# Patient Record
Sex: FEMALE | URBAN - METROPOLITAN AREA
[De-identification: names, ages, dates, MRNs, and addresses within clinical notes are randomized per-mention and may not be internally consistent; named-entity substitution may affect disease eponyms.]

---

## 2020-01-02 ENCOUNTER — APPOINTMENT (RX ONLY)
Age: 45
Setting detail: DERMATOLOGY
End: 2020-01-02

## 2020-01-02 DIAGNOSIS — L20.89 OTHER ATOPIC DERMATITIS: ICD-10-CM

## 2020-01-02 PROCEDURE — 99202 OFFICE O/P NEW SF 15 MIN: CPT

## 2020-01-02 PROCEDURE — ? PRESCRIPTION

## 2020-01-02 PROCEDURE — ? COUNSELING

## 2020-01-02 PROCEDURE — ? DIAGNOSIS COMMENT

## 2020-01-02 RX ORDER — TACROLIMUS 1 MG/G
OINTMENT TOPICAL
Qty: 1 | Refills: 0 | Status: ERX | COMMUNITY
Start: 2020-01-02

## 2020-01-02 RX ADMIN — TACROLIMUS: 1 OINTMENT TOPICAL at 00:00

## 2020-01-02 ASSESSMENT — SEVERITY ASSESSMENT: SEVERITY: MODERATE

## 2020-01-02 NOTE — PROCEDURE: DIAGNOSIS COMMENT
Comment: Also consider allergic contact dermatitis to vitamin E, components of her pen (which she uses to scratch her face) and/or irritant dermatitis from her regular use of alcohol swabs. I instructed her to limit her products to Protopic, Vaseline, and Dove unscented only for now.
Detail Level: Zone

## 2020-01-02 NOTE — PROCEDURE: COUNSELING
Patient Specific Counseling (Will Not Stick From Patient To Patient): **Take Claritin 2-3 tablets daily. Ok to add Benadryl in addition to the Claritin.\\nShe has a history of osteoporosis and thus declines oral steroids which is reasonable; She requested Elidel but I prefer Protopic with the petrolatum base.  Protopic is covered by her insurance.  We discussed the black box warnings of lymphoma.
Detail Level: Generalized

## 2020-01-03 ENCOUNTER — APPOINTMENT (RX ONLY)
Age: 45
Setting detail: DERMATOLOGY
End: 2020-01-03

## 2020-01-03 DIAGNOSIS — L20.89 OTHER ATOPIC DERMATITIS: ICD-10-CM

## 2020-01-03 PROCEDURE — ? PRESCRIPTION

## 2020-01-03 RX ORDER — PIMECROLIMUS 10 MG/G
CREAM TOPICAL
Qty: 1 | Refills: 1 | Status: ERX | COMMUNITY
Start: 2020-01-03

## 2020-01-03 RX ADMIN — PIMECROLIMUS: 10 CREAM TOPICAL at 00:00

## 2020-01-06 ENCOUNTER — APPOINTMENT (RX ONLY)
Age: 45
Setting detail: DERMATOLOGY
End: 2020-01-06

## 2020-01-06 DIAGNOSIS — L20.89 OTHER ATOPIC DERMATITIS: ICD-10-CM

## 2020-01-06 PROCEDURE — 99212 OFFICE O/P EST SF 10 MIN: CPT

## 2020-01-06 PROCEDURE — ? PATIENT SPECIFIC COUNSELING

## 2020-01-06 NOTE — PROCEDURE: PATIENT SPECIFIC COUNSELING
**Patient is struggling to try to figure out which products to use.  She is having patch testing soon which may be helpful. She brought in a few products which I don't recommend using the products brought in due to peppermint oil. I also told her that I prefer tacrolimus over Elidel since it has fewer ingredients and a more effective vehicle (ointment vs. cream). Her itch is out of control. Continue topicals until the results of the patch testing are available.
Detail Level: Zone

## 2020-01-09 ENCOUNTER — APPOINTMENT (RX ONLY)
Age: 45
Setting detail: DERMATOLOGY
End: 2020-01-09

## 2020-01-09 DIAGNOSIS — L20.89 OTHER ATOPIC DERMATITIS: ICD-10-CM

## 2020-01-09 PROCEDURE — ? PATIENT SPECIFIC COUNSELING

## 2020-01-09 PROCEDURE — 99213 OFFICE O/P EST LOW 20 MIN: CPT

## 2020-01-16 ENCOUNTER — APPOINTMENT (RX ONLY)
Age: 45
Setting detail: DERMATOLOGY
End: 2020-01-16

## 2020-01-16 DIAGNOSIS — L20.89 OTHER ATOPIC DERMATITIS: ICD-10-CM | Status: WORSENING

## 2020-01-16 PROCEDURE — ? DIAGNOSIS COMMENT

## 2020-01-16 PROCEDURE — ? PATIENT SPECIFIC COUNSELING

## 2020-01-16 PROCEDURE — 99214 OFFICE O/P EST MOD 30 MIN: CPT

## 2020-01-16 PROCEDURE — ? PRESCRIPTION

## 2020-01-16 PROCEDURE — ? ORDER TESTS

## 2020-01-16 NOTE — PROCEDURE: DIAGNOSIS COMMENT
Comment: we discussed the possible role of infection; I recommended she start antibiotics (she prefers tetracycline to doxy) but she wants to wait for results of culture; we will call her with results as soon as they are available
Detail Level: Zone

## 2020-01-21 ENCOUNTER — APPOINTMENT (RX ONLY)
Age: 45
Setting detail: DERMATOLOGY
End: 2020-01-21

## 2020-01-21 DIAGNOSIS — L20.89 OTHER ATOPIC DERMATITIS: ICD-10-CM | Status: IMPROVED

## 2020-01-21 DIAGNOSIS — A49.02 METHICILLIN RESISTANT STAPHYLOCOCCUS AUREUS INFECTION, UNSPECIFIED SITE: ICD-10-CM

## 2020-01-21 PROCEDURE — 99213 OFFICE O/P EST LOW 20 MIN: CPT

## 2020-01-21 PROCEDURE — ? PATIENT SPECIFIC COUNSELING

## 2020-01-21 PROCEDURE — ? COUNSELING

## 2020-01-21 PROCEDURE — ? PRESCRIPTION

## 2020-01-21 RX ORDER — CHLORHEXIDINE GLUCONATE 1.2 MG/ML
RINSE ORAL
Qty: 1 | Refills: 0 | Status: ERX | COMMUNITY
Start: 2020-01-21

## 2020-01-21 RX ORDER — CHLORHEXIDINE GLUCONATE 4 G/100ML
SOLUTION TOPICAL
Qty: 1 | Refills: 1 | Status: ERX | COMMUNITY
Start: 2020-01-21

## 2020-01-21 RX ORDER — MUPIROCIN 20 MG/G
OINTMENT TOPICAL
Qty: 1 | Refills: 3 | Status: ERX | COMMUNITY
Start: 2020-01-21

## 2020-01-21 RX ADMIN — MUPIROCIN: 20 OINTMENT TOPICAL at 00:00

## 2020-01-21 RX ADMIN — CHLORHEXIDINE GLUCONATE: 4 SOLUTION TOPICAL at 00:00

## 2020-01-21 RX ADMIN — CHLORHEXIDINE GLUCONATE: 1.2 RINSE ORAL at 00:00

## 2020-01-21 NOTE — PROCEDURE: PATIENT SPECIFIC COUNSELING
Detail Level: Zone
She has an appt with ID to discuss her concerns about C.diff.
**Use Elidel 2x daily, take full course tetracycline and avoid taking probiotic supplements at the same hour as taking tetracycline. I provided her with written MRSA decolonization guidelines. Use Vanicream or olive oil soap to wash the hands after touching the face. Avoid using or rubbing alcohol on the face as it leads to more skin irritation.

## 2020-01-23 ENCOUNTER — RX ONLY (OUTPATIENT)
Age: 45
Setting detail: RX ONLY
End: 2020-01-23

## 2020-01-23 RX ORDER — CHLORHEXIDINE GLUCONATE 1.2 MG/ML
RINSE ORAL
Qty: 1 | Refills: 0 | Status: ERX

## 2020-01-30 ENCOUNTER — APPOINTMENT (RX ONLY)
Age: 45
Setting detail: DERMATOLOGY
End: 2020-01-30

## 2020-01-30 DIAGNOSIS — L20.89 OTHER ATOPIC DERMATITIS: ICD-10-CM | Status: IMPROVED

## 2020-01-30 DIAGNOSIS — H01.13 ECZEMATOUS DERMATITIS OF EYELID: ICD-10-CM

## 2020-01-30 PROBLEM — H01.139 ECZEMATOUS DERMATITIS OF UNSPECIFIED EYE, UNSPECIFIED EYELID: Status: ACTIVE | Noted: 2020-01-30

## 2020-01-30 PROCEDURE — 99213 OFFICE O/P EST LOW 20 MIN: CPT

## 2020-01-30 PROCEDURE — ? ORDER TESTS

## 2020-01-30 PROCEDURE — ? PATIENT SPECIFIC COUNSELING

## 2020-01-30 NOTE — PROCEDURE: PATIENT SPECIFIC COUNSELING
**Clean the house and essentials once weekly with diluted bleach, we discussed the dangers of overcleaning and of using harsh cleansers on her skin. The infection has improved but skin around mouth is still raw so will reswab today.
Detail Level: Zone

## 2020-02-05 RX ADMIN — CLOTRIMAZOLE: 10 LOZENGE ORAL; TOPICAL at 00:00

## 2020-02-06 ENCOUNTER — APPOINTMENT (RX ONLY)
Age: 45
Setting detail: DERMATOLOGY
End: 2020-02-06

## 2020-02-06 DIAGNOSIS — B37.2 CANDIDIASIS OF SKIN AND NAIL: ICD-10-CM

## 2020-02-06 PROCEDURE — ? PRESCRIPTION

## 2020-02-06 RX ORDER — CLOTRIMAZOLE 10 MG/1
LOZENGE ORAL; TOPICAL
Qty: 1 | Refills: 1 | Status: ERX | COMMUNITY
Start: 2020-02-05

## 2020-02-10 ENCOUNTER — APPOINTMENT (RX ONLY)
Age: 45
Setting detail: DERMATOLOGY
End: 2020-02-10

## 2020-02-10 DIAGNOSIS — H01.13 ECZEMATOUS DERMATITIS OF EYELID: ICD-10-CM | Status: WORSENING

## 2020-02-10 DIAGNOSIS — B37.2 CANDIDIASIS OF SKIN AND NAIL: ICD-10-CM | Status: IMPROVED

## 2020-02-10 PROBLEM — H01.139 ECZEMATOUS DERMATITIS OF UNSPECIFIED EYE, UNSPECIFIED EYELID: Status: ACTIVE | Noted: 2020-02-10

## 2020-02-10 PROCEDURE — ? PATIENT SPECIFIC COUNSELING

## 2020-02-10 PROCEDURE — ? ORDER TESTS

## 2020-02-10 PROCEDURE — ? PRESCRIPTION

## 2020-02-10 PROCEDURE — 99214 OFFICE O/P EST MOD 30 MIN: CPT

## 2020-02-10 RX ORDER — CLOTRIMAZOLE 10 MG/1
LOZENGE ORAL; TOPICAL
Qty: 35 | Refills: 0 | Status: ERX

## 2020-02-10 NOTE — PROCEDURE: PATIENT SPECIFIC COUNSELING
Detail Level: Zone
the culture was for the eyelid dermatitis (see below) given recent MRSA infection of eyelids
**Apply mupirocin and desonide twice daily for 7 days on eyelids. Stop using Elidel for one week. Avoid Neosporin or other antibiotics on her eyelids except for mupirocin. She can also apply desonide on her perioral and axillae for one week

## 2020-02-18 ENCOUNTER — APPOINTMENT (RX ONLY)
Age: 45
Setting detail: DERMATOLOGY
End: 2020-02-18

## 2020-02-18 DIAGNOSIS — R19.7 DIARRHEA, UNSPECIFIED: ICD-10-CM

## 2020-02-18 DIAGNOSIS — B37.2 CANDIDIASIS OF SKIN AND NAIL: ICD-10-CM

## 2020-02-18 DIAGNOSIS — H01.13 ECZEMATOUS DERMATITIS OF EYELID: ICD-10-CM | Status: IMPROVED

## 2020-02-18 PROBLEM — H01.139 ECZEMATOUS DERMATITIS OF UNSPECIFIED EYE, UNSPECIFIED EYELID: Status: ACTIVE | Noted: 2020-02-18

## 2020-02-18 PROCEDURE — ? PRESCRIPTION SAMPLES PROVIDED

## 2020-02-18 PROCEDURE — 99213 OFFICE O/P EST LOW 20 MIN: CPT

## 2020-02-18 PROCEDURE — ? OTHER

## 2020-02-18 PROCEDURE — ? PATIENT SPECIFIC COUNSELING

## 2020-02-18 NOTE — PROCEDURE: PATIENT SPECIFIC COUNSELING
Detail Level: Zone
**Continue taking clotrimazole lozenges for her candidiasis as directed.
**Her lips and eyelids are basically clear today; stop desonide and use elidel at the first sign of any flare;  Her axillae and arms still have some rash, continue triamcinolone for 1-2 weeks.  Moisturize!!!!

## 2020-02-18 NOTE — PROCEDURE: OTHER
Note Text (......Xxx Chief Complaint.): This diagnosis correlates with the
Detail Level: Zone
Other (Free Text): I have repeatedly counseled the patient to get help from her PCP or ID regarding her diarrhea since the diagnosis and management of this is outside the scope of my practice.  We did discuss Pedialyte for K+ replacement.

## 2020-03-02 ENCOUNTER — APPOINTMENT (RX ONLY)
Age: 45
Setting detail: DERMATOLOGY
End: 2020-03-02

## 2020-03-02 DIAGNOSIS — H01.13 ECZEMATOUS DERMATITIS OF EYELID: ICD-10-CM

## 2020-03-02 PROBLEM — H01.139 ECZEMATOUS DERMATITIS OF UNSPECIFIED EYE, UNSPECIFIED EYELID: Status: ACTIVE | Noted: 2020-03-02

## 2020-03-02 PROCEDURE — ? ORDER TESTS

## 2020-03-02 PROCEDURE — 99213 OFFICE O/P EST LOW 20 MIN: CPT

## 2020-03-02 PROCEDURE — ? PATIENT SPECIFIC COUNSELING

## 2020-03-02 ASSESSMENT — LOCATION ZONE DERM: LOCATION ZONE: FACE

## 2020-03-02 ASSESSMENT — LOCATION SIMPLE DESCRIPTION DERM: LOCATION SIMPLE: RIGHT EYEBROW

## 2020-03-02 ASSESSMENT — LOCATION DETAILED DESCRIPTION DERM: LOCATION DETAILED: RIGHT CENTRAL EYEBROW

## 2020-03-02 NOTE — PROCEDURE: PATIENT SPECIFIC COUNSELING
Detail Level: Zone
**Restart Elidel cream on eyelids once daily for 2 wks and avoid using fragrant moisturizer on the body. If Elidel doesn't work, use desonide as directed on the eyelids instead. I recommended less bathing and use vaseline on her lips.

## 2020-03-23 ENCOUNTER — RX ONLY (OUTPATIENT)
Age: 45
Setting detail: RX ONLY
End: 2020-03-23

## 2020-03-23 ENCOUNTER — APPOINTMENT (RX ONLY)
Age: 45
Setting detail: DERMATOLOGY
End: 2020-03-23

## 2020-03-23 DIAGNOSIS — H01.13 ECZEMATOUS DERMATITIS OF EYELID: ICD-10-CM | Status: IMPROVED

## 2020-03-23 PROBLEM — J30.2 OTHER SEASONAL ALLERGIC RHINITIS: Status: ACTIVE | Noted: 2020-03-23

## 2020-03-23 PROBLEM — H01.139 ECZEMATOUS DERMATITIS OF UNSPECIFIED EYE, UNSPECIFIED EYELID: Status: ACTIVE | Noted: 2020-03-23

## 2020-03-23 PROCEDURE — ? DIAGNOSIS COMMENT

## 2020-03-23 PROCEDURE — ? PRESCRIPTION

## 2020-03-23 PROCEDURE — 99214 OFFICE O/P EST MOD 30 MIN: CPT

## 2020-03-23 PROCEDURE — ? PATIENT SPECIFIC COUNSELING

## 2020-03-23 RX ORDER — PIMECROLIMUS 10 MG/G
CREAM TOPICAL
Qty: 1 | Refills: 5 | Status: ERX

## 2020-03-23 RX ORDER — DESONIDE 0.5 MG/G
CREAM TOPICAL
Qty: 1 | Refills: 3 | Status: ERX | COMMUNITY
Start: 2020-03-23

## 2020-03-23 RX ADMIN — DESONIDE: 0.5 CREAM TOPICAL at 00:00

## 2020-03-23 ASSESSMENT — LOCATION SIMPLE DESCRIPTION DERM: LOCATION SIMPLE: RIGHT EYEBROW

## 2020-03-23 ASSESSMENT — LOCATION DETAILED DESCRIPTION DERM: LOCATION DETAILED: RIGHT CENTRAL EYEBROW

## 2020-03-23 ASSESSMENT — LOCATION ZONE DERM: LOCATION ZONE: FACE

## 2020-03-23 NOTE — PROCEDURE: DIAGNOSIS COMMENT
Detail Level: Zone
Comment: GIven the current pollen count, birch tree is a likely trigger; I recommended daily antihistamines

## 2020-03-23 NOTE — PROCEDURE: PATIENT SPECIFIC COUNSELING
Detail Level: Zone
**Limit desonide to 5 days per month, otherwise use Elidel, both refilled today, we discussed niacinamide which helps some patients with eczema (helps skin hold onto water better) —she currently takes 500mg daily, she could try twice a day and see how it goes. \\nWe discussed the idea that dermatitis is chronic and that she may need treat long term. We discussed that not all flares can be explained (food allergy, etc); there are unknowable factors that can cause flares (likely intrinsic). We discussed the safety of topical meds--no lymphoma risk with Eldel but significant risk with overuse of desonide (glaucoma). Awaiting results of the food allergy testing.

## 2020-04-20 ENCOUNTER — APPOINTMENT (RX ONLY)
Age: 45
Setting detail: DERMATOLOGY
End: 2020-04-20

## 2020-04-20 DIAGNOSIS — B373 CANDIDIASIS OF VULVA AND VAGINA: ICD-10-CM

## 2020-04-20 DIAGNOSIS — L29.2 PRURITUS VULVAE: ICD-10-CM

## 2020-04-20 DIAGNOSIS — H60.39 OTHER INFECTIVE OTITIS EXTERNA: ICD-10-CM

## 2020-04-20 PROBLEM — B37.3 CANDIDIASIS OF VULVA AND VAGINA: Status: ACTIVE | Noted: 2020-04-20

## 2020-04-20 PROBLEM — H60.399 OTHER INFECTIVE OTITIS EXTERNA, UNSPECIFIED EAR: Status: ACTIVE | Noted: 2020-04-20

## 2020-04-20 PROCEDURE — ? PRESCRIPTION

## 2020-04-20 PROCEDURE — ? COUNSELING

## 2020-04-20 RX ORDER — MOMETASONE FUROATE 1 MG/G
CREAM TOPICAL
Qty: 1 | Refills: 1 | Status: ERX | COMMUNITY
Start: 2020-04-20

## 2020-04-20 RX ADMIN — MOMETASONE FUROATE: 1 CREAM TOPICAL at 00:00

## 2020-04-20 ASSESSMENT — LOCATION ZONE DERM: LOCATION ZONE: VULVA

## 2020-04-20 ASSESSMENT — LOCATION DETAILED DESCRIPTION DERM: LOCATION DETAILED: RIGHT LABIUM MINUS

## 2020-04-20 ASSESSMENT — LOCATION SIMPLE DESCRIPTION DERM: LOCATION SIMPLE: LABIA MINORA

## 2020-05-21 ENCOUNTER — APPOINTMENT (RX ONLY)
Age: 45
Setting detail: DERMATOLOGY
End: 2020-05-21

## 2020-05-21 DIAGNOSIS — H01.13 ECZEMATOUS DERMATITIS OF EYELID: ICD-10-CM

## 2020-05-21 DIAGNOSIS — B373 CANDIDIASIS OF VULVA AND VAGINA: ICD-10-CM

## 2020-05-21 PROBLEM — H01.139 ECZEMATOUS DERMATITIS OF UNSPECIFIED EYE, UNSPECIFIED EYELID: Status: ACTIVE | Noted: 2020-05-21

## 2020-05-21 PROBLEM — B37.3 CANDIDIASIS OF VULVA AND VAGINA: Status: ACTIVE | Noted: 2020-05-21

## 2020-05-21 PROCEDURE — ? PATIENT SPECIFIC COUNSELING

## 2020-05-21 PROCEDURE — ? PRESCRIPTION

## 2020-05-21 PROCEDURE — 99213 OFFICE O/P EST LOW 20 MIN: CPT

## 2020-05-21 ASSESSMENT — LOCATION ZONE DERM: LOCATION ZONE: FACE

## 2020-05-21 ASSESSMENT — LOCATION SIMPLE DESCRIPTION DERM: LOCATION SIMPLE: RIGHT EYEBROW

## 2020-05-21 ASSESSMENT — LOCATION DETAILED DESCRIPTION DERM: LOCATION DETAILED: RIGHT CENTRAL EYEBROW

## 2020-05-21 NOTE — HPI: ITCHING (GENITALS)
How Did The Itching Occur?: sudden in onset (over a period of weeks to a few months)
How Severe Is The Itching?: moderate

## 2020-05-21 NOTE — PROCEDURE: PATIENT SPECIFIC COUNSELING
Detail Level: Zone
She is overall improved which is good news; she can use Elidel as needed
Since she has suffered from genital itch for a while and improves with antifungal therapy, we decided to start the typical 3 day course and then take it once a week for 2 months for better control of yeast.She will RTC in 2 months for recheck.  \\Chantal also had a long discussion about her many symptoms and her frustration with the health care system which has not been able to meet her needs; we discussed trying to find a physician who could put her symptoms together is a unifying way and I encouraged her to go back to rheumatology for further discussion; she saw Raymond Pritchett in the past and had success with some of the meds at that time but she doesn't want to go back to him since he now is at [place] and she is distrustful of the [employer] system. She still has rash in her axilla and I encouraged her to come in for biopsy as this could fill in a piece to her puzzle of poor health.

## 2020-10-30 ENCOUNTER — RX ONLY (OUTPATIENT)
Age: 45
Setting detail: RX ONLY
End: 2020-10-30

## 2020-10-30 ENCOUNTER — APPOINTMENT (RX ONLY)
Age: 45
Setting detail: DERMATOLOGY
End: 2020-10-30

## 2020-10-30 DIAGNOSIS — H01.13 ECZEMATOUS DERMATITIS OF EYELID: ICD-10-CM

## 2020-10-30 PROBLEM — H01.139 ECZEMATOUS DERMATITIS OF UNSPECIFIED EYE, UNSPECIFIED EYELID: Status: ACTIVE | Noted: 2020-10-30

## 2020-10-30 PROCEDURE — ? PRESCRIPTION

## 2020-10-30 RX ORDER — DESONIDE 0.5 MG/G
OINTMENT TOPICAL
Qty: 1 | Refills: 0 | Status: ERX

## 2020-10-30 RX ORDER — DESONIDE 0.5 MG/G
OINTMENT TOPICAL
Qty: 1 | Refills: 0 | Status: ERX | COMMUNITY
Start: 2020-10-30

## 2020-10-30 RX ADMIN — DESONIDE: 0.5 OINTMENT TOPICAL at 00:00

## 2020-11-05 ENCOUNTER — APPOINTMENT (RX ONLY)
Age: 45
Setting detail: DERMATOLOGY
End: 2020-11-05

## 2020-11-05 VITALS — TEMPERATURE: 97.5 F

## 2020-11-05 DIAGNOSIS — H01.13 ECZEMATOUS DERMATITIS OF EYELID: ICD-10-CM

## 2020-11-05 DIAGNOSIS — D22 MELANOCYTIC NEVI: ICD-10-CM

## 2020-11-05 DIAGNOSIS — L29.2 PRURITUS VULVAE: ICD-10-CM | Status: STABLE

## 2020-11-05 PROBLEM — D22.5 MELANOCYTIC NEVI OF TRUNK: Status: ACTIVE | Noted: 2020-11-05

## 2020-11-05 PROBLEM — H01.139 ECZEMATOUS DERMATITIS OF UNSPECIFIED EYE, UNSPECIFIED EYELID: Status: ACTIVE | Noted: 2020-11-05

## 2020-11-05 PROCEDURE — ? OBSERVATION

## 2020-11-05 PROCEDURE — ? ORDER TESTS

## 2020-11-05 PROCEDURE — 99214 OFFICE O/P EST MOD 30 MIN: CPT

## 2020-11-05 PROCEDURE — ? PATIENT SPECIFIC COUNSELING

## 2020-11-05 PROCEDURE — ? COUNSELING

## 2020-11-05 ASSESSMENT — LOCATION SIMPLE DESCRIPTION DERM
LOCATION SIMPLE: GENITALIA
LOCATION SIMPLE: RIGHT EYEBROW

## 2020-11-05 ASSESSMENT — LOCATION ZONE DERM
LOCATION ZONE: TRUNK
LOCATION ZONE: FACE

## 2020-11-05 ASSESSMENT — LOCATION DETAILED DESCRIPTION DERM
LOCATION DETAILED: GENITALIA
LOCATION DETAILED: RIGHT CENTRAL EYEBROW

## 2020-11-05 NOTE — PROCEDURE: PATIENT SPECIFIC COUNSELING
Detail Level: Detailed
She can continue desonide for a few more days, then switch to Elidel. I doubt infection given lack of redness and scale; I suspect she is worsening with the onset of colder/wetter weather which we discussed. We will call her with the results when available

## 2020-11-05 NOTE — PROCEDURE: COUNSELING
Detail Level: Zone
Patient Specific Counseling (Will Not Stick From Patient To Patient): We discussed the uncertainty in the diagnosis. I offered biopsy which she declined today; we discussed the procedure and the risks; she would like to have it removed and will schedule an appt.
Patient Specific Counseling (Will Not Stick From Patient To Patient): **Warned the pt about the overuse of steroids on the labia majora (she has one hypopigmented patch) and recommended that she continue using the Elidel.
Detail Level: Generalized

## 2020-11-10 RX ADMIN — Medication: at 00:00

## 2020-11-11 ENCOUNTER — APPOINTMENT (RX ONLY)
Age: 45
Setting detail: DERMATOLOGY
End: 2020-11-11

## 2020-11-11 DIAGNOSIS — A49.02 METHICILLIN RESISTANT STAPHYLOCOCCUS AUREUS INFECTION, UNSPECIFIED SITE: ICD-10-CM

## 2020-11-11 PROCEDURE — ? PRESCRIPTION

## 2020-11-11 RX ORDER — GENTAMICIN SULFATE 0.3 %
OINTMENT (GRAM) OPHTHALMIC (EYE)
Qty: 1 | Refills: 1 | Status: ERX | COMMUNITY
Start: 2020-11-10

## 2020-11-19 ENCOUNTER — RX ONLY (OUTPATIENT)
Age: 45
Setting detail: RX ONLY
End: 2020-11-19

## 2020-11-19 ENCOUNTER — APPOINTMENT (RX ONLY)
Age: 45
Setting detail: DERMATOLOGY
End: 2020-11-19

## 2020-11-19 VITALS — TEMPERATURE: 96.8 F

## 2020-11-19 DIAGNOSIS — L29.2 PRURITUS VULVAE: ICD-10-CM

## 2020-11-19 DIAGNOSIS — A49.02 METHICILLIN RESISTANT STAPHYLOCOCCUS AUREUS INFECTION, UNSPECIFIED SITE: ICD-10-CM

## 2020-11-19 DIAGNOSIS — H01.13 ECZEMATOUS DERMATITIS OF EYELID: ICD-10-CM

## 2020-11-19 PROBLEM — H01.139 ECZEMATOUS DERMATITIS OF UNSPECIFIED EYE, UNSPECIFIED EYELID: Status: ACTIVE | Noted: 2020-11-19

## 2020-11-19 PROCEDURE — ? PATIENT SPECIFIC COUNSELING

## 2020-11-19 PROCEDURE — 99214 OFFICE O/P EST MOD 30 MIN: CPT

## 2020-11-19 PROCEDURE — ? ORDER TESTS

## 2020-11-19 PROCEDURE — ? DIAGNOSIS COMMENT

## 2020-11-19 RX ORDER — PIMECROLIMUS 10 MG/G
CREAM TOPICAL
Qty: 1 | Refills: 5 | Status: ERX

## 2020-11-19 RX ORDER — MUPIROCIN 20 MG/G
OINTMENT TOPICAL
Qty: 1 | Refills: 3 | Status: ERX

## 2020-11-19 ASSESSMENT — LOCATION ZONE DERM: LOCATION ZONE: FACE

## 2020-11-19 ASSESSMENT — LOCATION SIMPLE DESCRIPTION DERM: LOCATION SIMPLE: RIGHT EYEBROW

## 2020-11-19 ASSESSMENT — LOCATION DETAILED DESCRIPTION DERM: LOCATION DETAILED: RIGHT CENTRAL EYEBROW

## 2020-11-19 NOTE — PROCEDURE: ORDER TESTS
Bill For Surgical Tray: no
Billing Type: Third-Party Bill
Expected Date Of Service: 11/18/2020
Expected Date Of Service: 11/05/2020

## 2020-11-19 NOTE — PROCEDURE: PATIENT SPECIFIC COUNSELING
Detail Level: Detailed
We had a long discussion today about a variety of factors: we discussed that the fact that topical antibioctics are not helping is evidence that the MRSA+ culture suggests colonization, not infection. We reviewed prior notes and noted that in the past she improved with Elidel (more than desonide).  We discussed the MRSA colonization in the context of her upcoming nose surgery-- she needs to inform the surgeons and treat the inside of nose with topical mupirocin. We discussed that cyclobenzaprine (that she uses for muscle issues) is associated with rashes and it may be worth taking a break.

## 2020-11-23 ENCOUNTER — APPOINTMENT (RX ONLY)
Age: 45
Setting detail: DERMATOLOGY
End: 2020-11-23

## 2020-11-23 VITALS — TEMPERATURE: 98.2 F

## 2020-11-23 DIAGNOSIS — F43.0 ACUTE STRESS REACTION: ICD-10-CM

## 2020-11-23 DIAGNOSIS — H01.13 ECZEMATOUS DERMATITIS OF EYELID: ICD-10-CM | Status: IMPROVED

## 2020-11-23 PROBLEM — H01.139 ECZEMATOUS DERMATITIS OF UNSPECIFIED EYE, UNSPECIFIED EYELID: Status: ACTIVE | Noted: 2020-11-23

## 2020-11-23 PROCEDURE — ? PATIENT SPECIFIC COUNSELING

## 2020-11-23 PROCEDURE — ? DIAGNOSIS COMMENT

## 2020-11-23 PROCEDURE — 99213 OFFICE O/P EST LOW 20 MIN: CPT

## 2020-11-23 ASSESSMENT — SEVERITY ASSESSMENT: SEVERITY: ALMOST CLEAR

## 2020-11-23 ASSESSMENT — LOCATION SIMPLE DESCRIPTION DERM: LOCATION SIMPLE: RIGHT EYEBROW

## 2020-11-23 ASSESSMENT — LOCATION ZONE DERM: LOCATION ZONE: FACE

## 2020-11-23 ASSESSMENT — LOCATION DETAILED DESCRIPTION DERM: LOCATION DETAILED: RIGHT CENTRAL EYEBROW

## 2020-11-23 NOTE — PROCEDURE: PATIENT SPECIFIC COUNSELING
Detail Level: Detailed
We had a long discussion today about a variety of factors. Her eyelids are better, less red and less swollen. We discussed that Mario always fontaine at first (normal side effect) and she should keep using it. She has stopped the topical antibiotics.  No sign of infection today.

## 2020-11-23 NOTE — PROCEDURE: DIAGNOSIS COMMENT
Comment: We discussed her home situation which is an struggle right now.  She would like to move out (lives with her parents) but this is hard because of the pandemic.  She is frustrated but not feeling in danger. She is looking for a family therapist but having trouble trying to find one.
Detail Level: Detailed

## 2020-12-17 ENCOUNTER — RX ONLY (OUTPATIENT)
Age: 45
Setting detail: RX ONLY
End: 2020-12-17

## 2020-12-17 ENCOUNTER — APPOINTMENT (RX ONLY)
Age: 45
Setting detail: DERMATOLOGY
End: 2020-12-17

## 2020-12-17 VITALS — TEMPERATURE: 97.5 F

## 2020-12-17 DIAGNOSIS — H01.13 ECZEMATOUS DERMATITIS OF EYELID: ICD-10-CM

## 2020-12-17 DIAGNOSIS — L21.8 OTHER SEBORRHEIC DERMATITIS: ICD-10-CM

## 2020-12-17 DIAGNOSIS — L29.2 PRURITUS VULVAE: ICD-10-CM

## 2020-12-17 PROBLEM — H01.139 ECZEMATOUS DERMATITIS OF UNSPECIFIED EYE, UNSPECIFIED EYELID: Status: ACTIVE | Noted: 2020-12-17

## 2020-12-17 PROCEDURE — ? PATIENT SPECIFIC COUNSELING

## 2020-12-17 PROCEDURE — ? ORDER TESTS

## 2020-12-17 PROCEDURE — ? PRESCRIPTION

## 2020-12-17 PROCEDURE — ? COUNSELING

## 2020-12-17 PROCEDURE — 99214 OFFICE O/P EST MOD 30 MIN: CPT

## 2020-12-17 PROCEDURE — ? DIAGNOSIS COMMENT

## 2020-12-17 RX ORDER — MOMETASONE FUROATE 1 MG/G
CREAM TOPICAL
Qty: 1 | Refills: 2 | Status: ERX | COMMUNITY
Start: 2020-12-17

## 2020-12-17 RX ORDER — FLUCONAZOLE 200 MG/1
TABLET ORAL
Qty: 6 | Refills: 11 | Status: ERX

## 2020-12-17 RX ORDER — KETOCONAZOLE 20 MG/ML
SHAMPOO, SUSPENSION TOPICAL
Qty: 1 | Refills: 6 | Status: ERX | COMMUNITY
Start: 2020-12-17

## 2020-12-17 RX ADMIN — KETOCONAZOLE: 20 SHAMPOO, SUSPENSION TOPICAL at 00:00

## 2020-12-17 ASSESSMENT — LOCATION SIMPLE DESCRIPTION DERM: LOCATION SIMPLE: RIGHT EYEBROW

## 2020-12-17 ASSESSMENT — LOCATION ZONE DERM: LOCATION ZONE: FACE

## 2020-12-17 ASSESSMENT — LOCATION DETAILED DESCRIPTION DERM: LOCATION DETAILED: RIGHT CENTRAL EYEBROW

## 2020-12-17 ASSESSMENT — SEVERITY ASSESSMENT: SEVERITY: MILD

## 2020-12-17 NOTE — PROCEDURE: ORDER TESTS
Bill For Surgical Tray: no
Expected Date Of Service: 11/18/2020
Expected Date Of Service: 12/17/2020
Billing Type: Third-Party Bill

## 2020-12-17 NOTE — PROCEDURE: DIAGNOSIS COMMENT
Comment: She notes that her her eyelid improvement correlated with her fluconazole use (which she used last spring)
Detail Level: Simple
Patient Management Risk Assessment: Low

## 2021-01-01 ENCOUNTER — APPOINTMENT (RX ONLY)
Age: 46
Setting detail: DERMATOLOGY
End: 2021-01-01

## 2021-01-01 DIAGNOSIS — B37.0 CANDIDAL STOMATITIS: ICD-10-CM

## 2021-01-01 DIAGNOSIS — A49.02 METHICILLIN RESISTANT STAPHYLOCOCCUS AUREUS INFECTION, UNSPECIFIED SITE: ICD-10-CM

## 2021-01-01 PROCEDURE — ? PRESCRIPTION

## 2021-01-01 RX ORDER — CLOTRIMAZOLE 10 MG/1
LOZENGE ORAL; TOPICAL
Qty: 1 | Refills: 1 | Status: ERX

## 2021-01-01 RX ORDER — SULFAMETHOXAZOLE AND TRIMETHOPRIM 400; 80 MG/1; MG/1
TABLET ORAL
Qty: 14 | Refills: 0 | Status: ERX | COMMUNITY
Start: 2021-01-01

## 2021-01-01 RX ADMIN — SULFAMETHOXAZOLE AND TRIMETHOPRIM: 400; 80 TABLET ORAL at 00:00

## 2021-01-14 ENCOUNTER — APPOINTMENT (RX ONLY)
Age: 46
Setting detail: DERMATOLOGY
End: 2021-01-14

## 2021-01-14 DIAGNOSIS — H01.13 ECZEMATOUS DERMATITIS OF EYELID: ICD-10-CM | Status: IMPROVED

## 2021-01-14 DIAGNOSIS — A49.02 METHICILLIN RESISTANT STAPHYLOCOCCUS AUREUS INFECTION, UNSPECIFIED SITE: ICD-10-CM | Status: IMPROVED

## 2021-01-14 PROBLEM — H01.139 ECZEMATOUS DERMATITIS OF UNSPECIFIED EYE, UNSPECIFIED EYELID: Status: ACTIVE | Noted: 2021-01-14

## 2021-01-14 PROCEDURE — ? TREATMENT REGIMEN

## 2021-01-14 PROCEDURE — ? ORDER TESTS

## 2021-01-14 PROCEDURE — ? PATIENT SPECIFIC COUNSELING

## 2021-01-14 PROCEDURE — ? DIAGNOSIS COMMENT

## 2021-01-14 PROCEDURE — 99213 OFFICE O/P EST LOW 20 MIN: CPT

## 2021-01-14 ASSESSMENT — LOCATION SIMPLE DESCRIPTION DERM: LOCATION SIMPLE: RIGHT EYEBROW

## 2021-01-14 ASSESSMENT — LOCATION ZONE DERM: LOCATION ZONE: FACE

## 2021-01-14 ASSESSMENT — LOCATION DETAILED DESCRIPTION DERM: LOCATION DETAILED: RIGHT CENTRAL EYEBROW

## 2021-01-14 NOTE — PROCEDURE: PATIENT SPECIFIC COUNSELING
Detail Level: Detailed
**I recommended that she sanitize her bathroom and kitchen with bleach once a week. I also recommended that she sanitize her car once a week and wash her bedding weekly.

## 2021-01-14 NOTE — PROCEDURE: DIAGNOSIS COMMENT
Detail Level: Simple
Comment: She tolerated the low dose bactrim well.
Render Risk Assessment In Note?: no
Comment: If topical steroids continue to fail we will consider methotrexate or other systemic medications.

## 2021-01-28 ENCOUNTER — APPOINTMENT (RX ONLY)
Age: 46
Setting detail: DERMATOLOGY
End: 2021-01-28

## 2021-01-28 VITALS — TEMPERATURE: 98.4 F

## 2021-01-28 DIAGNOSIS — H01.13 ECZEMATOUS DERMATITIS OF EYELID: ICD-10-CM

## 2021-01-28 DIAGNOSIS — L259 CONTACT DERMATITIS AND OTHER ECZEMA, UNSPECIFIED CAUSE: ICD-10-CM

## 2021-01-28 PROBLEM — H01.139 ECZEMATOUS DERMATITIS OF UNSPECIFIED EYE, UNSPECIFIED EYELID: Status: ACTIVE | Noted: 2021-01-28

## 2021-01-28 PROBLEM — L30.8 OTHER SPECIFIED DERMATITIS: Status: ACTIVE | Noted: 2021-01-28

## 2021-01-28 PROCEDURE — ? ORDER TESTS

## 2021-01-28 PROCEDURE — ? PRESCRIPTION SAMPLES PROVIDED

## 2021-01-28 PROCEDURE — 99213 OFFICE O/P EST LOW 20 MIN: CPT

## 2021-01-28 PROCEDURE — ? PATIENT SPECIFIC COUNSELING

## 2021-01-28 ASSESSMENT — LOCATION SIMPLE DESCRIPTION DERM: LOCATION SIMPLE: RIGHT EYEBROW

## 2021-01-28 ASSESSMENT — LOCATION ZONE DERM: LOCATION ZONE: FACE

## 2021-01-28 ASSESSMENT — LOCATION DETAILED DESCRIPTION DERM: LOCATION DETAILED: RIGHT CENTRAL EYEBROW

## 2021-01-28 NOTE — PROCEDURE: PRESCRIPTION SAMPLES PROVIDED
Samples Given: Eucrisa ointment 2%
Detail Level: Simple
Expiration Date (Optional): 04/2021
Lot/Batch Number (Optional): [id number]

## 2021-01-28 NOTE — PROCEDURE: PATIENT SPECIFIC COUNSELING
Gilda is very frustrated because she is not really improving and her itch is very intense.  She doesn't think that the topicals are helping and may in fact be irritating.  We discussed other options, such as Dupixent which is known for it's ability to control itch. However, it sometimes causes eyelids to flare. We also discussed a different topical (Eucrisa) which is hard to get improved but sometimes can be a game changer.  Unclear to me if related to IVIG frequency changes. She will try a sample of Eucrisa.
Detail Level: Detailed

## 2021-02-11 ENCOUNTER — APPOINTMENT (RX ONLY)
Age: 46
Setting detail: DERMATOLOGY
End: 2021-02-11

## 2021-02-11 VITALS — TEMPERATURE: 98.2 F

## 2021-02-11 DIAGNOSIS — B373 CANDIDIASIS OF VULVA AND VAGINA: ICD-10-CM

## 2021-02-11 DIAGNOSIS — H01.13 ECZEMATOUS DERMATITIS OF EYELID: ICD-10-CM | Status: IMPROVED

## 2021-02-11 PROBLEM — B37.3 CANDIDIASIS OF VULVA AND VAGINA: Status: ACTIVE | Noted: 2021-02-11

## 2021-02-11 PROBLEM — H01.139 ECZEMATOUS DERMATITIS OF UNSPECIFIED EYE, UNSPECIFIED EYELID: Status: ACTIVE | Noted: 2021-02-11

## 2021-02-11 PROCEDURE — ? PRESCRIPTION SAMPLES PROVIDED

## 2021-02-11 PROCEDURE — ? PRESCRIPTION

## 2021-02-11 PROCEDURE — 99214 OFFICE O/P EST MOD 30 MIN: CPT

## 2021-02-11 PROCEDURE — ? ORDER TESTS

## 2021-02-11 PROCEDURE — ? PATIENT SPECIFIC COUNSELING

## 2021-02-11 RX ORDER — CRISABOROLE 20 MG/G
OINTMENT TOPICAL
Qty: 1 | Refills: 1 | Status: ERX | COMMUNITY
Start: 2021-02-11

## 2021-02-11 RX ADMIN — CRISABOROLE: 20 OINTMENT TOPICAL at 00:00

## 2021-02-11 ASSESSMENT — LOCATION ZONE DERM: LOCATION ZONE: FACE

## 2021-02-11 ASSESSMENT — LOCATION SIMPLE DESCRIPTION DERM: LOCATION SIMPLE: RIGHT EYEBROW

## 2021-02-11 ASSESSMENT — LOCATION DETAILED DESCRIPTION DERM: LOCATION DETAILED: RIGHT CENTRAL EYEBROW

## 2021-02-11 NOTE — PROCEDURE: PATIENT SPECIFIC COUNSELING
**I reviewed the importance of washing her face with soap once daily. We also discussed the correlation between her eyelid dermatitis and the dosage of her IVIG treatments. The pt would like to have her immunoglobin levels tested in relationship to her IVIG; I will research which lab panels are appropriate and mail her the lab slip.
**I directed her to take fluconazole for three days in a row, then once a week for maintenance.
Detail Level: Detailed

## 2021-02-11 NOTE — PROCEDURE: PRESCRIPTION SAMPLES PROVIDED
Lot/Batch Number (Optional): [id number]
Detail Level: Simple
Samples Given: Eucrisa ointment 2%
Expiration Date (Optional): 04/2021

## 2021-02-22 RX ADMIN — TRIAMCINOLONE ACETONIDE: 1 OINTMENT TOPICAL at 00:00

## 2021-02-23 ENCOUNTER — RX ONLY (OUTPATIENT)
Age: 46
Setting detail: RX ONLY
End: 2021-02-23

## 2021-02-23 ENCOUNTER — APPOINTMENT (RX ONLY)
Age: 46
Setting detail: DERMATOLOGY
End: 2021-02-23

## 2021-02-23 VITALS — TEMPERATURE: 97.3 F

## 2021-02-23 DIAGNOSIS — L29.2 PRURITUS VULVAE: ICD-10-CM

## 2021-02-23 DIAGNOSIS — R53.83 OTHER FATIGUE: ICD-10-CM

## 2021-02-23 DIAGNOSIS — H01.13 ECZEMATOUS DERMATITIS OF EYELID: ICD-10-CM

## 2021-02-23 DIAGNOSIS — L259 CONTACT DERMATITIS AND OTHER ECZEMA, UNSPECIFIED CAUSE: ICD-10-CM

## 2021-02-23 DIAGNOSIS — B373 CANDIDIASIS OF VULVA AND VAGINA: ICD-10-CM

## 2021-02-23 PROBLEM — H01.139 ECZEMATOUS DERMATITIS OF UNSPECIFIED EYE, UNSPECIFIED EYELID: Status: ACTIVE | Noted: 2021-02-23

## 2021-02-23 PROBLEM — L30.8 OTHER SPECIFIED DERMATITIS: Status: ACTIVE | Noted: 2021-02-23

## 2021-02-23 PROBLEM — B37.3 CANDIDIASIS OF VULVA AND VAGINA: Status: ACTIVE | Noted: 2021-02-23

## 2021-02-23 PROCEDURE — ? DIAGNOSIS COMMENT

## 2021-02-23 PROCEDURE — ? PATIENT SPECIFIC COUNSELING

## 2021-02-23 PROCEDURE — ? PRESCRIPTION

## 2021-02-23 PROCEDURE — 99214 OFFICE O/P EST MOD 30 MIN: CPT

## 2021-02-23 PROCEDURE — ? ORDER TESTS

## 2021-02-23 PROCEDURE — ? COUNSELING

## 2021-02-23 RX ORDER — MOMETASONE FUROATE 1 MG/G
CREAM TOPICAL
Qty: 1 | Refills: 3 | Status: ERX

## 2021-02-23 RX ORDER — TRIAMCINOLONE ACETONIDE 1 MG/G
OINTMENT TOPICAL QD
Qty: 1 | Refills: 2 | Status: ERX | COMMUNITY
Start: 2021-02-22

## 2021-02-23 RX ORDER — DESONIDE 0.5 MG/G
OINTMENT TOPICAL
Qty: 1 | Refills: 2 | Status: ERX

## 2021-02-23 ASSESSMENT — LOCATION DETAILED DESCRIPTION DERM
LOCATION DETAILED: RIGHT LABIUM MINUS
LOCATION DETAILED: RIGHT CENTRAL EYEBROW

## 2021-02-23 ASSESSMENT — LOCATION SIMPLE DESCRIPTION DERM
LOCATION SIMPLE: LABIA MINORA
LOCATION SIMPLE: RIGHT EYEBROW

## 2021-02-23 ASSESSMENT — LOCATION ZONE DERM
LOCATION ZONE: FACE
LOCATION ZONE: VULVA

## 2021-02-23 NOTE — PROCEDURE: PATIENT SPECIFIC COUNSELING
**Her eyelids are stable. Given her chronic MRSA colonization, we reviewed the importance of using soap on her face and cleaning her house.  However, given her limited time, we discussed that cleaning the house every other week is adequate.  I refilled her desonide today.We discussed her labs-- RH negative, PAIGE negative but IgE is high which can be c/w allergic or atopic conditions (see below).
Detail Level: Detailed
**I refilled her mometasone today
**I directed her to take fluconazole for three days in a row, then once a week for maintenance.

## 2021-02-23 NOTE — PROCEDURE: DIAGNOSIS COMMENT
Comment: reordered her labs under proper diagnosis
Detail Level: Simple
Render Risk Assessment In Note?: no

## 2021-02-23 NOTE — PROCEDURE: COUNSELING
Detail Level: Zone
Patient Specific Counseling (Will Not Stick From Patient To Patient): **I gave her a sample of Vaniply Ointment today. I also gave her a printout on eczema and its correlation with stress: Psychoneuroimmunology of Psychological Stress and Atopic Dermatitis: Pathophysiologic and Therapeutic Updates. We discussed the atopic triad and how common it is for eczema, asthma, and allergies to occur together.

## 2021-04-22 ENCOUNTER — APPOINTMENT (RX ONLY)
Age: 46
Setting detail: DERMATOLOGY
End: 2021-04-22

## 2021-04-22 VITALS — TEMPERATURE: 97.2 F

## 2021-04-22 DIAGNOSIS — B95.61 METHICILLIN SUSCEPTIBLE STAPHYLOCOCCUS AUREUS INFECTION AS THE CAUSE OF DISEASES CLASSIFIED ELSEWHERE: ICD-10-CM

## 2021-04-22 DIAGNOSIS — L29.2 PRURITUS VULVAE: ICD-10-CM | Status: UNCHANGED

## 2021-04-22 DIAGNOSIS — H01.13 ECZEMATOUS DERMATITIS OF EYELID: ICD-10-CM | Status: IMPROVED

## 2021-04-22 DIAGNOSIS — R53.83 OTHER FATIGUE: ICD-10-CM

## 2021-04-22 DIAGNOSIS — B373 CANDIDIASIS OF VULVA AND VAGINA: ICD-10-CM

## 2021-04-22 DIAGNOSIS — L259 CONTACT DERMATITIS AND OTHER ECZEMA, UNSPECIFIED CAUSE: ICD-10-CM

## 2021-04-22 PROBLEM — B37.3 CANDIDIASIS OF VULVA AND VAGINA: Status: ACTIVE | Noted: 2021-04-22

## 2021-04-22 PROBLEM — L30.8 OTHER SPECIFIED DERMATITIS: Status: ACTIVE | Noted: 2021-04-22

## 2021-04-22 PROBLEM — H01.139 ECZEMATOUS DERMATITIS OF UNSPECIFIED EYE, UNSPECIFIED EYELID: Status: ACTIVE | Noted: 2021-04-22

## 2021-04-22 PROCEDURE — ? COUNSELING

## 2021-04-22 PROCEDURE — 99214 OFFICE O/P EST MOD 30 MIN: CPT

## 2021-04-22 PROCEDURE — ? PATIENT SPECIFIC COUNSELING

## 2021-04-22 ASSESSMENT — LOCATION SIMPLE DESCRIPTION DERM
LOCATION SIMPLE: LABIA MINORA
LOCATION SIMPLE: RIGHT EYEBROW

## 2021-04-22 ASSESSMENT — LOCATION ZONE DERM
LOCATION ZONE: VULVA
LOCATION ZONE: FACE

## 2021-04-22 ASSESSMENT — LOCATION DETAILED DESCRIPTION DERM
LOCATION DETAILED: RIGHT LABIUM MINUS
LOCATION DETAILED: RIGHT CENTRAL EYEBROW

## 2021-04-22 NOTE — PROCEDURE: COUNSELING
Detail Level: Zone
Patient Specific Counseling (Will Not Stick From Patient To Patient): **Her legs appear xerotic and continue to itch. I counseled her on the importance of using a thick cream moisturizer on her legs, we discussed Cerave and similar brands.

## 2021-04-22 NOTE — PROCEDURE: PATIENT SPECIFIC COUNSELING
We discussed the strain of regular cleaning to prevent recurrent MRSA infections; given that her eyelid derm is well controlled, she can take a break; however, I did recommend that she cleanse with soap daily on her face to prevent recurrence
continue mometasone as needed
Detail Level: Detailed
**Her eyelids have significantly improved. She will RTC in two weeks before she gets her IVIG Tx to see if there are any changes as her current dose wears off.
**We discussed the idea that some women with chronic vaginal itch have been sensitized to candida (allergic) and that a trial of weekly fluconazole is likely safe and would help us better understand the nature of her vaginal itch.
I counseled her on the importance of getting enough sleep for her general health.

## 2021-05-25 ENCOUNTER — APPOINTMENT (RX ONLY)
Age: 46
Setting detail: DERMATOLOGY
End: 2021-05-25

## 2021-05-25 VITALS — TEMPERATURE: 98.1 F

## 2021-05-25 DIAGNOSIS — L29.2 PRURITUS VULVAE: ICD-10-CM

## 2021-05-25 DIAGNOSIS — H01.13 ECZEMATOUS DERMATITIS OF EYELID: ICD-10-CM

## 2021-05-25 DIAGNOSIS — D22 MELANOCYTIC NEVI: ICD-10-CM

## 2021-05-25 PROBLEM — H01.139 ECZEMATOUS DERMATITIS OF UNSPECIFIED EYE, UNSPECIFIED EYELID: Status: ACTIVE | Noted: 2021-05-25

## 2021-05-25 PROBLEM — D22.5 MELANOCYTIC NEVI OF TRUNK: Status: ACTIVE | Noted: 2021-05-25

## 2021-05-25 PROCEDURE — ? OBSERVATION

## 2021-05-25 PROCEDURE — ? PATIENT SPECIFIC COUNSELING

## 2021-05-25 PROCEDURE — 99213 OFFICE O/P EST LOW 20 MIN: CPT

## 2021-05-25 PROCEDURE — ? ORDER TESTS

## 2021-05-25 ASSESSMENT — LOCATION SIMPLE DESCRIPTION DERM
LOCATION SIMPLE: GROIN
LOCATION SIMPLE: RIGHT EYEBROW
LOCATION SIMPLE: LABIA MINORA

## 2021-05-25 ASSESSMENT — LOCATION ZONE DERM
LOCATION ZONE: FACE
LOCATION ZONE: TRUNK
LOCATION ZONE: VULVA

## 2021-05-25 ASSESSMENT — LOCATION DETAILED DESCRIPTION DERM
LOCATION DETAILED: RIGHT LABIUM MINUS
LOCATION DETAILED: RIGHT CENTRAL EYEBROW
LOCATION DETAILED: RIGHT SUPRAPUBIC SKIN

## 2021-05-25 NOTE — PROCEDURE: PATIENT SPECIFIC COUNSELING
continue mometasone as needed
Detail Level: Detailed
**Her eyelids are flaring a bit, culture pending, I recommended a specific brand of fragrance free hand  as well as hypochlorous acid to deal with the MRSA issue on her eyelids. She is still not comfortable using soap on her face.

## 2021-06-10 ENCOUNTER — APPOINTMENT (RX ONLY)
Age: 46
Setting detail: DERMATOLOGY
End: 2021-06-10

## 2021-06-10 DIAGNOSIS — H01.13 ECZEMATOUS DERMATITIS OF EYELID: ICD-10-CM

## 2021-06-10 DIAGNOSIS — L29.2 PRURITUS VULVAE: ICD-10-CM

## 2021-06-10 PROBLEM — H01.139 ECZEMATOUS DERMATITIS OF UNSPECIFIED EYE, UNSPECIFIED EYELID: Status: ACTIVE | Noted: 2021-06-10

## 2021-06-10 PROCEDURE — 99213 OFFICE O/P EST LOW 20 MIN: CPT

## 2021-06-10 PROCEDURE — ? PATIENT SPECIFIC COUNSELING

## 2021-06-10 ASSESSMENT — LOCATION ZONE DERM: LOCATION ZONE: FACE

## 2021-06-10 ASSESSMENT — LOCATION SIMPLE DESCRIPTION DERM: LOCATION SIMPLE: RIGHT EYEBROW

## 2021-06-10 ASSESSMENT — LOCATION DETAILED DESCRIPTION DERM: LOCATION DETAILED: RIGHT CENTRAL EYEBROW

## 2021-06-10 NOTE — PROCEDURE: PATIENT SPECIFIC COUNSELING
Detail Level: Detailed
We discussed that it is unlikely that she has pinworms or genital lice. Due to her past diagnosis of psoriasis from another dermatologist, it is likely that her itching and redness are due to this. I recommended that she avoid shaving and continue mometasone as needed. The medication for pinworms is available OTC if she is really worried about it.
**Her eyelids are close to clear today. I counseled her on how to use the hypochlorous acid (avoid rinsing eyelids after application) and directed her to use it prn for flares of itching. She will continue doing IVIG treatments every two weeks.

## 2021-08-10 ENCOUNTER — APPOINTMENT (RX ONLY)
Age: 46
Setting detail: DERMATOLOGY
End: 2021-08-10

## 2021-08-10 DIAGNOSIS — H01.13 ECZEMATOUS DERMATITIS OF EYELID: ICD-10-CM

## 2021-08-10 PROBLEM — H01.134 ECZEMATOUS DERMATITIS OF LEFT UPPER EYELID: Status: ACTIVE | Noted: 2021-08-10

## 2021-08-10 PROCEDURE — ? ORDER TESTS

## 2021-08-10 ASSESSMENT — LOCATION DETAILED DESCRIPTION DERM: LOCATION DETAILED: LEFT LATERAL SUPERIOR EYELID

## 2021-08-10 ASSESSMENT — LOCATION SIMPLE DESCRIPTION DERM: LOCATION SIMPLE: LEFT SUPERIOR EYELID

## 2021-08-10 ASSESSMENT — LOCATION ZONE DERM: LOCATION ZONE: EYELID

## 2021-08-12 ENCOUNTER — APPOINTMENT (RX ONLY)
Age: 46
Setting detail: DERMATOLOGY
End: 2021-08-12

## 2021-08-12 ENCOUNTER — RX ONLY (OUTPATIENT)
Age: 46
Setting detail: RX ONLY
End: 2021-08-12

## 2021-08-12 DIAGNOSIS — H00.01 HORDEOLUM EXTERNUM: ICD-10-CM

## 2021-08-12 DIAGNOSIS — H01.13 ECZEMATOUS DERMATITIS OF EYELID: ICD-10-CM

## 2021-08-12 PROBLEM — H01.139 ECZEMATOUS DERMATITIS OF UNSPECIFIED EYE, UNSPECIFIED EYELID: Status: ACTIVE | Noted: 2021-08-12

## 2021-08-12 PROBLEM — H00.019 HORDEOLUM EXTERNUM UNSPECIFIED EYE, UNSPECIFIED EYELID: Status: ACTIVE | Noted: 2021-08-12

## 2021-08-12 PROCEDURE — ? PRESCRIPTION

## 2021-08-12 PROCEDURE — 99213 OFFICE O/P EST LOW 20 MIN: CPT

## 2021-08-12 PROCEDURE — ? DIAGNOSIS COMMENT

## 2021-08-12 PROCEDURE — ? PATIENT SPECIFIC COUNSELING

## 2021-08-12 RX ORDER — GENTAMICIN SULFATE 0.3 %
OINTMENT (GRAM) OPHTHALMIC (EYE)
Qty: 1 | Refills: 1 | Status: ERX | COMMUNITY
Start: 2021-08-12

## 2021-08-12 RX ORDER — CIPROFLOXACIN HYDROCHLORIDE 3 MG/ML
SOLUTION/ DROPS OPHTHALMIC
Qty: 1 | Refills: 0 | Status: ERX | COMMUNITY
Start: 2021-08-12

## 2021-08-12 RX ADMIN — CIPROFLOXACIN HYDROCHLORIDE: 3 SOLUTION/ DROPS OPHTHALMIC at 00:00

## 2021-08-12 ASSESSMENT — LOCATION ZONE DERM: LOCATION ZONE: FACE

## 2021-08-12 ASSESSMENT — LOCATION DETAILED DESCRIPTION DERM: LOCATION DETAILED: RIGHT CENTRAL EYEBROW

## 2021-08-12 ASSESSMENT — LOCATION SIMPLE DESCRIPTION DERM: LOCATION SIMPLE: RIGHT EYEBROW

## 2021-08-12 NOTE — PROCEDURE: DIAGNOSIS COMMENT
Render Risk Assessment In Note?: yes
Comment: culture is pending, she has h/o MRSA
Detail Level: Simple

## 2021-08-12 NOTE — PROCEDURE: PATIENT SPECIFIC COUNSELING
Continue usual care, I did recommend either soap and water once daily or hypochlorous acid eye wash
Detail Level: Zone

## 2021-09-14 ENCOUNTER — APPOINTMENT (RX ONLY)
Age: 46
Setting detail: DERMATOLOGY
End: 2021-09-14

## 2021-09-14 DIAGNOSIS — Z86.14 PERSONAL HISTORY OF METHICILLIN RESISTANT STAPHYLOCOCCUS AUREUS INFECTION: ICD-10-CM

## 2021-09-14 DIAGNOSIS — L21.8 OTHER SEBORRHEIC DERMATITIS: ICD-10-CM

## 2021-09-14 DIAGNOSIS — H01.13 ECZEMATOUS DERMATITIS OF EYELID: ICD-10-CM

## 2021-09-14 PROBLEM — H01.139 ECZEMATOUS DERMATITIS OF UNSPECIFIED EYE, UNSPECIFIED EYELID: Status: ACTIVE | Noted: 2021-09-14

## 2021-09-14 PROCEDURE — ? ORDER TESTS

## 2021-09-14 PROCEDURE — 99213 OFFICE O/P EST LOW 20 MIN: CPT

## 2021-09-14 PROCEDURE — ? COUNSELING

## 2021-09-14 PROCEDURE — ? PATIENT SPECIFIC COUNSELING

## 2021-09-14 ASSESSMENT — LOCATION ZONE DERM: LOCATION ZONE: FACE

## 2021-09-14 ASSESSMENT — LOCATION SIMPLE DESCRIPTION DERM: LOCATION SIMPLE: RIGHT EYEBROW

## 2021-09-14 ASSESSMENT — LOCATION DETAILED DESCRIPTION DERM: LOCATION DETAILED: RIGHT CENTRAL EYEBROW

## 2021-09-14 NOTE — PROCEDURE: COUNSELING
Detail Level: Detailed
Patient Specific Counseling (Will Not Stick From Patient To Patient): We discussed the difference between colonization and infection in terms of her MRSA+ cultures (which likely are related to colonization). It will be interesting to see if she is still MRSA+ given that she is better but never used antibiotics. We did discuss the antibiotic activity of hypochlorous acid.

## 2021-09-14 NOTE — PROCEDURE: PATIENT SPECIFIC COUNSELING
Detail Level: Detailed
**Her eyelids are clear today. I directed her to continue using the hypochlorous acid prn for itching/redness.

## 2021-11-09 ENCOUNTER — APPOINTMENT (RX ONLY)
Age: 46
Setting detail: DERMATOLOGY
End: 2021-11-09

## 2021-11-09 DIAGNOSIS — H60.39 OTHER INFECTIVE OTITIS EXTERNA: ICD-10-CM

## 2021-11-09 PROBLEM — H60.399 OTHER INFECTIVE OTITIS EXTERNA, UNSPECIFIED EAR: Status: ACTIVE | Noted: 2021-11-09

## 2021-11-09 PROCEDURE — ? PRESCRIPTION

## 2021-11-14 RX ORDER — CIPROFLOXACIN AND DEXAMETHASONE 3; 1 MG/ML; MG/ML
SUSPENSION/ DROPS AURICULAR (OTIC)
Qty: 7.5 | Refills: 2 | Status: ERX | COMMUNITY
Start: 2021-11-14

## 2021-11-14 RX ADMIN — CIPROFLOXACIN AND DEXAMETHASONE: 3; 1 SUSPENSION/ DROPS AURICULAR (OTIC) at 00:00

## 2021-12-08 ENCOUNTER — APPOINTMENT (RX ONLY)
Age: 46
Setting detail: DERMATOLOGY
End: 2021-12-08

## 2021-12-08 DIAGNOSIS — L21.8 OTHER SEBORRHEIC DERMATITIS: ICD-10-CM

## 2021-12-08 DIAGNOSIS — Z86.14 PERSONAL HISTORY OF METHICILLIN RESISTANT STAPHYLOCOCCUS AUREUS INFECTION: ICD-10-CM

## 2021-12-08 DIAGNOSIS — L29.2 PRURITUS VULVAE: ICD-10-CM | Status: INADEQUATELY CONTROLLED

## 2021-12-08 DIAGNOSIS — H01.13 ECZEMATOUS DERMATITIS OF EYELID: ICD-10-CM

## 2021-12-08 PROBLEM — H01.139 ECZEMATOUS DERMATITIS OF UNSPECIFIED EYE, UNSPECIFIED EYELID: Status: ACTIVE | Noted: 2021-12-08

## 2021-12-08 PROCEDURE — 99214 OFFICE O/P EST MOD 30 MIN: CPT

## 2021-12-08 PROCEDURE — ? COUNSELING

## 2021-12-08 PROCEDURE — ? ORDER TESTS

## 2021-12-08 PROCEDURE — ? PATIENT SPECIFIC COUNSELING

## 2021-12-08 PROCEDURE — ? PRESCRIPTION

## 2021-12-08 RX ORDER — FLUCONAZOLE 200 MG/1
TABLET ORAL
Qty: 6 | Refills: 5 | Status: ERX | COMMUNITY
Start: 2021-12-08

## 2021-12-08 RX ADMIN — FLUCONAZOLE: 200 TABLET ORAL at 00:00

## 2021-12-08 RX ADMIN — TOBRAMYCIN / DEXAMETHASONE: 3; .5 SUSPENSION/ DROPS OPHTHALMIC at 00:00

## 2021-12-08 ASSESSMENT — LOCATION DETAILED DESCRIPTION DERM: LOCATION DETAILED: RIGHT CENTRAL EYEBROW

## 2021-12-08 ASSESSMENT — LOCATION ZONE DERM: LOCATION ZONE: FACE

## 2021-12-08 ASSESSMENT — LOCATION SIMPLE DESCRIPTION DERM: LOCATION SIMPLE: RIGHT EYEBROW

## 2021-12-08 NOTE — PROCEDURE: COUNSELING
Detail Level: Simple
Detail Level: Detailed
Patient Specific Counseling (Will Not Stick From Patient To Patient): Reviewed prior culture results from right ear and eyelid. We discussed the difference between colonization and infection in terms of her MRSA+ cultures (which likely are related to colonization).

## 2021-12-09 RX ORDER — TOBRAMYCIN / DEXAMETHASONE 3; .5 MG/ML; MG/ML
SUSPENSION/ DROPS OPHTHALMIC
Qty: 5 | Refills: 3 | Status: ERX | COMMUNITY
Start: 2021-12-08

## 2021-12-20 ENCOUNTER — RX ONLY (OUTPATIENT)
Age: 46
Setting detail: RX ONLY
End: 2021-12-20

## 2021-12-20 RX ORDER — TOBRAMYCIN / DEXAMETHASONE 3; .5 MG/ML; MG/ML
SUSPENSION/ DROPS OPHTHALMIC
Qty: 5 | Refills: 3 | Status: ERX | COMMUNITY
Start: 2021-12-20

## 2022-02-17 ENCOUNTER — RX ONLY (OUTPATIENT)
Age: 47
Setting detail: RX ONLY
End: 2022-02-17

## 2022-02-17 RX ORDER — TOBRAMYCIN / DEXAMETHASONE 3; .5 MG/ML; MG/ML
SUSPENSION/ DROPS OPHTHALMIC
Qty: 5 | Refills: 3 | Status: ERX | COMMUNITY
Start: 2022-02-17

## 2022-02-17 RX ORDER — KETOCONAZOLE 20 MG/ML
SHAMPOO, SUSPENSION TOPICAL
Qty: 120 | Refills: 11 | Status: ERX | COMMUNITY
Start: 2022-02-17

## 2022-02-23 ENCOUNTER — RX ONLY (OUTPATIENT)
Age: 47
Setting detail: RX ONLY
End: 2022-02-23

## 2022-02-23 RX ORDER — DESONIDE 0.5 MG/G
OINTMENT TOPICAL
Qty: 15 | Refills: 2 | Status: ERX | COMMUNITY
Start: 2022-02-23

## 2022-03-22 ENCOUNTER — APPOINTMENT (RX ONLY)
Age: 47
Setting detail: DERMATOLOGY
End: 2022-03-22

## 2022-03-22 DIAGNOSIS — Z86.14 PERSONAL HISTORY OF METHICILLIN RESISTANT STAPHYLOCOCCUS AUREUS INFECTION: ICD-10-CM

## 2022-03-22 PROCEDURE — ? TREATMENT REGIMEN

## 2022-03-22 NOTE — PROCEDURE: TREATMENT REGIMEN
Initiate Treatment: O.3% tobramycin ophthalmic ointment (preservative free) was called into [facility] pharmacy; they will call her when available
Detail Level: Zone

## 2022-03-31 ENCOUNTER — APPOINTMENT (RX ONLY)
Age: 47
Setting detail: DERMATOLOGY
End: 2022-03-31

## 2022-03-31 DIAGNOSIS — L21.8 OTHER SEBORRHEIC DERMATITIS: ICD-10-CM

## 2022-03-31 DIAGNOSIS — H01.13 ECZEMATOUS DERMATITIS OF EYELID: ICD-10-CM

## 2022-03-31 DIAGNOSIS — Z86.14 PERSONAL HISTORY OF METHICILLIN RESISTANT STAPHYLOCOCCUS AUREUS INFECTION: ICD-10-CM

## 2022-03-31 PROBLEM — H01.139 ECZEMATOUS DERMATITIS OF UNSPECIFIED EYE, UNSPECIFIED EYELID: Status: ACTIVE | Noted: 2022-03-31

## 2022-03-31 PROCEDURE — ? COUNSELING

## 2022-03-31 PROCEDURE — ? PRESCRIPTION

## 2022-03-31 PROCEDURE — ? PATIENT SPECIFIC COUNSELING

## 2022-03-31 PROCEDURE — 99213 OFFICE O/P EST LOW 20 MIN: CPT

## 2022-03-31 RX ORDER — KETOCONAZOLE 20 MG/G
CREAM TOPICAL
Qty: 30 | Refills: 2 | Status: ERX | COMMUNITY
Start: 2022-03-31

## 2022-03-31 RX ADMIN — KETOCONAZOLE: 20 CREAM TOPICAL at 00:00

## 2022-03-31 NOTE — PROCEDURE: PATIENT SPECIFIC COUNSELING
Detail Level: Zone
Currently well controlled.  Since her MRSA causes flares, we discussed the compounded formulation that I sent in.  She is worried about a mineral oil base (which they recommended). We discussed trying mineral oil before purchasing compound medication or finding another base. She could consider Dr. Bae Restore ointment or 100% Shea butter for the compounded medication. Can mix with glycerin to make the compound more liquid. Advised her to test these products on her inner forearm with a paper tape bandage and monitor for reaction before using them on her face.\\n\\nI continued to recommend washing face at least a few times per week with mild cleanser.

## 2022-03-31 NOTE — PROCEDURE: COUNSELING
Patient Specific Counseling (Will Not Stick From Patient To Patient): We discussed we typically treat the whole family to avoid recolonization. SInce her family doesn't prefer this, I recommended she uses hand  and avoid touching her face when in common spaces.  She reports that her face is itchy and it is hard to to touch it. I recommended using a Q tip if she has to touch her face.
Detail Level: Detailed

## 2022-08-11 ENCOUNTER — APPOINTMENT (RX ONLY)
Age: 47
Setting detail: DERMATOLOGY
End: 2022-08-11

## 2022-08-11 DIAGNOSIS — H01.13 ECZEMATOUS DERMATITIS OF EYELID: ICD-10-CM | Status: STABLE

## 2022-08-11 DIAGNOSIS — L21.8 OTHER SEBORRHEIC DERMATITIS: ICD-10-CM

## 2022-08-11 PROBLEM — H01.139 ECZEMATOUS DERMATITIS OF UNSPECIFIED EYE, UNSPECIFIED EYELID: Status: ACTIVE | Noted: 2022-08-11

## 2022-08-11 PROCEDURE — 99213 OFFICE O/P EST LOW 20 MIN: CPT

## 2022-08-11 PROCEDURE — ? PATIENT SPECIFIC COUNSELING

## 2022-08-11 NOTE — PROCEDURE: PATIENT SPECIFIC COUNSELING
Discussed alternatives shampoos including Free and Clear dandruff shampoo, DHS tar shampoo, DHS salicylic acid and DHS zinc shampoo. Recommended trying these in one area on her scalp to minimize risk of flaring. \\n\\nSamples were set aside for her to  at her convenience.
Detail Level: Simple
Continue dexamethasone as needed. \\n\\nDiscussed patch testing should be 48 hours with no disruption, during which she does not shower or work out. Discussed it may take up to \\n7 days to see a reaction. Recommended trial of hypafix tape instead of bandaids. Pt can stop by the clinic to get sample of hypafix tape and nathen chamber. She will try hypafix alone for 48 hours and monitor for reaction. \\n\\n\\nRecommended she try Tums without added dyes. Unclear why GI meds are associated with worsening eyelid dermatitis.

## 2022-08-23 ENCOUNTER — RX ONLY (OUTPATIENT)
Age: 47
Setting detail: RX ONLY
End: 2022-08-23

## 2022-08-23 RX ORDER — TOBRAMYCIN / DEXAMETHASONE 3; .5 MG/ML; MG/ML
SUSPENSION/ DROPS OPHTHALMIC
Qty: 5 | Refills: 3 | Status: ERX

## 2022-08-26 ENCOUNTER — APPOINTMENT (RX ONLY)
Age: 47
Setting detail: DERMATOLOGY
End: 2022-08-26

## 2022-08-26 DIAGNOSIS — A49.02 METHICILLIN RESISTANT STAPHYLOCOCCUS AUREUS INFECTION, UNSPECIFIED SITE: ICD-10-CM

## 2022-08-26 PROCEDURE — ? ORDER TESTS

## 2022-08-26 PROCEDURE — 99212 OFFICE O/P EST SF 10 MIN: CPT

## 2022-10-04 ENCOUNTER — APPOINTMENT (RX ONLY)
Age: 47
Setting detail: DERMATOLOGY
End: 2022-10-04

## 2022-10-04 DIAGNOSIS — H01.13 ECZEMATOUS DERMATITIS OF EYELID: ICD-10-CM

## 2022-10-04 DIAGNOSIS — G90.09 OTHER IDIOPATHIC PERIPHERAL AUTONOMIC NEUROPATHY: ICD-10-CM

## 2022-10-04 PROBLEM — H01.139 ECZEMATOUS DERMATITIS OF UNSPECIFIED EYE, UNSPECIFIED EYELID: Status: ACTIVE | Noted: 2022-10-04

## 2022-10-04 PROCEDURE — 99215 OFFICE O/P EST HI 40 MIN: CPT

## 2022-10-04 PROCEDURE — ? PATIENT SPECIFIC COUNSELING

## 2022-10-04 NOTE — PROCEDURE: PATIENT SPECIFIC COUNSELING
Detail Level: Detailed
Detail Level: Simple
Continue dexamethasone as needed. \\n\\nDiscussed in depth the process of patch testing. She should tape the strip down for 48 hours and remark the grid daily. Can mix oils with petrolatum. She can  additional tests at the clinic. The reaction can be delayed up to 7 days, even after washing.\\n\\nrecommended she make frequent appointments (every 3 weeks) and cancel appointments she doesn't need given it is difficult for her to get in for urgent issues. \\n
Recommended she take L-glutamine.\\n\\nDiscuss Pharmacogenetic testing for drug sensitivity and allergy. I will look into ordering this for her and follow up by phone.\\n\\nPt will send over labs and genetic test for my review.\\n\\nRecommended she applies to [facility] [place].

## 2022-11-02 ENCOUNTER — RX ONLY (OUTPATIENT)
Age: 47
Setting detail: RX ONLY
End: 2022-11-02

## 2022-11-02 ENCOUNTER — APPOINTMENT (RX ONLY)
Age: 47
Setting detail: DERMATOLOGY
End: 2022-11-02

## 2022-11-02 DIAGNOSIS — H01.13 ECZEMATOUS DERMATITIS OF EYELID: ICD-10-CM

## 2022-11-02 DIAGNOSIS — G90.09 OTHER IDIOPATHIC PERIPHERAL AUTONOMIC NEUROPATHY: ICD-10-CM

## 2022-11-02 PROBLEM — H01.131 ECZEMATOUS DERMATITIS OF RIGHT UPPER EYELID: Status: ACTIVE | Noted: 2022-11-02

## 2022-11-02 PROCEDURE — ? DIAGNOSIS COMMENT

## 2022-11-02 PROCEDURE — ? PATIENT SPECIFIC COUNSELING

## 2022-11-02 PROCEDURE — 99213 OFFICE O/P EST LOW 20 MIN: CPT

## 2022-11-02 RX ORDER — TOBRAMYCIN / DEXAMETHASONE 3; .5 MG/ML; MG/ML
SUSPENSION/ DROPS OPHTHALMIC
Qty: 10 | Refills: 3 | Status: ERX

## 2022-11-02 ASSESSMENT — LOCATION SIMPLE DESCRIPTION DERM: LOCATION SIMPLE: RIGHT SUPERIOR EYELID

## 2022-11-02 ASSESSMENT — LOCATION ZONE DERM: LOCATION ZONE: EYELID

## 2022-11-02 ASSESSMENT — LOCATION DETAILED DESCRIPTION DERM: LOCATION DETAILED: RIGHT SUPERIOR LID MARGIN

## 2022-11-02 NOTE — PROCEDURE: DIAGNOSIS COMMENT
Detail Level: Detailed
Render Risk Assessment In Note?: no
Comment: Itch and edema are currently stable, she is trying to find a base that she doesn't react to so that we can compound an antibiotic as she doesn't think tobramycin is working.

## 2022-11-02 NOTE — PROCEDURE: PATIENT SPECIFIC COUNSELING
We discussed the possibility of a biopsy to help characterize her (and her family's) neuropathy.  I can do the biopsy technically but can't order the test.  She will discuss this further with her mom's neurologist to see what can be ordered.

## 2022-11-02 NOTE — HPI: RASH (EYELID DERMATITIS)
Is This A New Presentation, Or A Follow-Up?: Follow Up Eyelid Dermatitis
How Severe Is Your Rash?: moderate

## 2022-11-03 NOTE — HPI: RASH (ALLERGIC CONTACT DERMATITIS)
How Severe Is Your Rash?: mild
Response To Previous Treatment?: topical treatments are ineffective
Is This A New Presentation, Or A Follow-Up?: Follow Up Allergic Contact Dermatitis
no chest pain and no edema.

## 2022-11-10 ENCOUNTER — APPOINTMENT (RX ONLY)
Age: 47
Setting detail: DERMATOLOGY
End: 2022-11-10

## 2022-11-10 DIAGNOSIS — H01.13 ECZEMATOUS DERMATITIS OF EYELID: ICD-10-CM

## 2022-11-10 DIAGNOSIS — G90.09 OTHER IDIOPATHIC PERIPHERAL AUTONOMIC NEUROPATHY: ICD-10-CM

## 2022-11-10 PROBLEM — H01.139 ECZEMATOUS DERMATITIS OF UNSPECIFIED EYE, UNSPECIFIED EYELID: Status: ACTIVE | Noted: 2022-11-10

## 2022-11-10 PROCEDURE — ? ORDER TESTS

## 2022-11-10 PROCEDURE — ? COUNSELING

## 2022-11-10 PROCEDURE — 99213 OFFICE O/P EST LOW 20 MIN: CPT

## 2022-11-10 NOTE — PROCEDURE: COUNSELING
Detail Level: Simple
Patient Specific Counseling (Will Not Stick From Patient To Patient): We discussed the possibility of skin biopsy to help better under the etiology of the neuropathy. As discussed previously, a neurologist would need to order any test that is needed. I am happy to do the biopsy (on her or her mom if she becomes my patient). Gilda is still working on getting an order from the neurologist. There is some risk associated with a leg biopsy -- infection, slow wound healing, etc.
Patient Specific Counseling (Will Not Stick From Patient To Patient): Wash face daily with soap and water\\nUse tobramycin topically as needed for MRSA\\nWill f/u culture results\\nUse desonide as needed (very sparingly)
Detail Level: Detailed

## 2022-12-02 ENCOUNTER — APPOINTMENT (RX ONLY)
Age: 47
Setting detail: DERMATOLOGY
End: 2022-12-02

## 2022-12-02 DIAGNOSIS — G90.09 OTHER IDIOPATHIC PERIPHERAL AUTONOMIC NEUROPATHY: ICD-10-CM

## 2022-12-02 DIAGNOSIS — H01.13 ECZEMATOUS DERMATITIS OF EYELID: ICD-10-CM

## 2022-12-02 PROBLEM — H01.139 ECZEMATOUS DERMATITIS OF UNSPECIFIED EYE, UNSPECIFIED EYELID: Status: ACTIVE | Noted: 2022-12-02

## 2022-12-02 PROCEDURE — 99213 OFFICE O/P EST LOW 20 MIN: CPT

## 2022-12-02 PROCEDURE — ? COUNSELING

## 2022-12-02 PROCEDURE — ? DIAGNOSIS COMMENT

## 2022-12-02 NOTE — PROCEDURE: COUNSELING
Detail Level: Detailed
Patient Specific Counseling (Will Not Stick From Patient To Patient): We had a long discussion today about how to do a biopsy to better evaluate her neuropathy. She has had extensive work up both locally and at [facility] and [facility].  Dr. Amanda (neuropath expert) at [facility] was uncertain what type she had but never did a biopsy. She currently is on IVIG which keeps it under some but not complete control. She would like me to order a kit from [facility] and/or [facility]. I can do this so that I have the materials on hand when we are ready to do the biopsy.  We discussed that the next step is for her neurologist to let me know exactly what needs to be ordered and then I can do the biopsy (3/4 mm punch of the lateral leg).

## 2022-12-08 ENCOUNTER — APPOINTMENT (RX ONLY)
Age: 47
Setting detail: DERMATOLOGY
End: 2022-12-08

## 2022-12-08 DIAGNOSIS — H01.13 ECZEMATOUS DERMATITIS OF EYELID: ICD-10-CM

## 2022-12-08 DIAGNOSIS — G90.09 OTHER IDIOPATHIC PERIPHERAL AUTONOMIC NEUROPATHY: ICD-10-CM | Status: INADEQUATELY CONTROLLED

## 2022-12-08 PROBLEM — H01.139 ECZEMATOUS DERMATITIS OF UNSPECIFIED EYE, UNSPECIFIED EYELID: Status: ACTIVE | Noted: 2022-12-08

## 2022-12-08 PROCEDURE — ? COUNSELING

## 2022-12-08 PROCEDURE — ? PATIENT SPECIFIC COUNSELING

## 2022-12-08 PROCEDURE — 99213 OFFICE O/P EST LOW 20 MIN: CPT

## 2022-12-08 NOTE — HPI: SKIN CHECK
What Is The Reason For Today's Visit?: Skin Lesion
Additional History: Pt is concerned about the punch biopsy but I informed her that the punch does not go too deep. \\n\\nIt is hard to get deep enough in the stomach and in the leg. I informed her that insurance companies need to know when/what biopsies are being done. I recommended nerve biopsies. She spoke to a neurologist at [facility]. She reports that it is risky to do IVIG due to bad communication. I recommended that she sees Dr. Grace Cannon because that is her mother’s doctor. Her biggest concern is that everything has been delayed. I let her know that she is forcing us into a corner. I am willing to help her connect with Dr. Cannon once she becomes a patient. Pt father manages her mom’s healthcare. Her mother created an appointment with Dr. Fajardo in January. His parents don’t want to leave home except for the biopsy. Pt ordered a biopsy kit/lab test from [facility] by writing an order for herself, and I informed that it is fraud. I let her know my time is reserved for other patients and that sometimes we aren’t as responsive. I let her know that the test she ordered from the [facility] needs to be incubated.

## 2022-12-08 NOTE — PROCEDURE: COUNSELING
Patient Specific Counseling (Will Not Stick From Patient To Patient): We discussed that I am not comfortable with proceeding with any procedure (nerve biopsy) without teaming up with a neurologist (such as Dr. Cannon).  The test kits that Gilda had shipped to the clinic cannot be used until we get orders from a neurologist.
Detail Level: Detailed

## 2022-12-08 NOTE — PROCEDURE: PATIENT SPECIFIC COUNSELING
Detail Level: Zone
Her culture grew out MRSA from last visit.  We again discussed referral to infectious diseases since we cannot clear her of this.  She reports that she has seen them in the past and they have not been helpful.

## 2023-01-13 ENCOUNTER — APPOINTMENT (RX ONLY)
Age: 48
Setting detail: DERMATOLOGY
End: 2023-01-13

## 2023-01-13 DIAGNOSIS — G90.09 OTHER IDIOPATHIC PERIPHERAL AUTONOMIC NEUROPATHY: ICD-10-CM

## 2023-01-13 DIAGNOSIS — H01.13 ECZEMATOUS DERMATITIS OF EYELID: ICD-10-CM

## 2023-01-13 DIAGNOSIS — L30.9 DERMATITIS, UNSPECIFIED: ICD-10-CM

## 2023-01-13 PROBLEM — H01.139 ECZEMATOUS DERMATITIS OF UNSPECIFIED EYE, UNSPECIFIED EYELID: Status: ACTIVE | Noted: 2023-01-13

## 2023-01-13 PROCEDURE — ? PATIENT SPECIFIC COUNSELING

## 2023-01-13 PROCEDURE — 99215 OFFICE O/P EST HI 40 MIN: CPT

## 2023-01-13 PROCEDURE — ? ORDER TESTS

## 2023-01-13 NOTE — PROCEDURE: PATIENT SPECIFIC COUNSELING
Detail Level: Detailed
Use soap and water daily to wash face.
We had a long discussion today about the possible diagnoses related to her neuropathy. Her doctors have been very straightforward, from what I can tell, about the fact that further work up will not change her prognosis. However she is a  and is not satisfied with these answers given how profound the neuropathy has affected her life as well as the lives of her family members. She would like further work up of her neuropathy to help her understand it better as well as plan for possible treatments which may not be available now but may be available in the future. Her differential diagnosis includes amyloidosis, sarcoidosis, and genetic neuropathy. \\Denny she and I have discussed on many occasions, I am happy to do any biopsy that her neurologist would recommend for her. I also explained that I am not comfortable ordering such test as I really don't know have the expertise to order nor interpret such tests. I do see the value in these tests if they really would help further characterize her disease. I have asked to speak to her neurologist but the patient has declined. I would be willing to talk to the neurologist at the [facility] Dr. Amanda. The neuropath 's number is [phone]. I did call this # but was unable to reach anyone.

## 2023-03-02 ENCOUNTER — APPOINTMENT (RX ONLY)
Age: 48
Setting detail: DERMATOLOGY
End: 2023-03-02

## 2023-03-02 DIAGNOSIS — E85 AMYLOIDOSIS: ICD-10-CM

## 2023-03-02 DIAGNOSIS — G90.09 OTHER IDIOPATHIC PERIPHERAL AUTONOMIC NEUROPATHY: ICD-10-CM

## 2023-03-02 PROBLEM — E85.9 AMYLOIDOSIS, UNSPECIFIED: Status: ACTIVE | Noted: 2023-03-02

## 2023-03-02 PROCEDURE — ? PRESCRIPTION

## 2023-03-02 PROCEDURE — 11105 PUNCH BX SKIN EA SEP/ADDL: CPT

## 2023-03-02 PROCEDURE — ? BIOPSY BY PUNCH METHOD

## 2023-03-02 PROCEDURE — 11104 PUNCH BX SKIN SINGLE LESION: CPT

## 2023-03-02 RX ORDER — MUPIROCIN 20 MG/G
OINTMENT TOPICAL
Qty: 15 | Refills: 1 | Status: ERX | COMMUNITY
Start: 2023-03-02

## 2023-03-02 RX ADMIN — MUPIROCIN: 20 OINTMENT TOPICAL at 00:00

## 2023-03-02 ASSESSMENT — LOCATION DETAILED DESCRIPTION DERM
LOCATION DETAILED: PERIUMBILICAL SKIN
LOCATION DETAILED: RIGHT LATERAL ANKLE

## 2023-03-02 ASSESSMENT — LOCATION SIMPLE DESCRIPTION DERM
LOCATION SIMPLE: RIGHT LOWER LEG
LOCATION SIMPLE: ABDOMEN

## 2023-03-02 ASSESSMENT — LOCATION ZONE DERM
LOCATION ZONE: LEG
LOCATION ZONE: TRUNK

## 2023-03-02 NOTE — PROCEDURE: BIOPSY BY PUNCH METHOD
Validate Triangulation: No
Post-Care Instructions: Today you had a skin biopsy done at [facility].\\n\\nLeave the bandage in place for 24 hours. After 24 hours, remove the bandage and clean with gentle soap and water. Please clean the site with soap and water at least daily. Apply Vaseline or Aquaphor ointment to the site at least once or twice daily.\\n\\nAfter the first 24 hours, you may leave the site uncovered or, if you choose, you may cover it with a new bandage. \\n\\nIf a scab develops over the biopsy site, please do not pick it off. \\n \\nPlease keep the area protected from theh sun with sun screen or protective clothing, such as a hat. \\n\\nPlease return to the office to have the sutures removed in 10 days.\\n\\nIf you develop increasing pain, redness, or drainage at the site, or any other concerns, please contact us.\\n\\nYou should receive your biopsy results generally within 7 to 14 days (either at a return visit or by phone). If you do not receive your results within 2 weeks, it is important that you contact us.
Hemostasis: None
Anesthesia Type: 2% lidocaine with epinephrine
Size Of Lesion In Cm (Optional): 0
Was A Bandage Applied: Yes
Depth Of Punch Biopsy: dermis
Path Notes (To The Dermatopathologist): .
Epidermal Sutures: 5-0 Surgipro
Information: Selecting Yes will display possible errors in your note based on the variables you have selected. This validation is only offered as a suggestion for you. PLEASE NOTE THAT THE VALIDATION TEXT WILL BE REMOVED WHEN YOU FINALIZE YOUR NOTE. IF YOU WANT TO FAX A PRELIMINARY NOTE YOU WILL NEED TO TOGGLE THIS TO 'NO' IF YOU DO NOT WANT IT IN YOUR FAXED NOTE.
Dressing: pressure dressing
Biopsy Type: H and E
Punch Size In Mm: 3
Notification Instructions: Patient will be notified of biopsy results. However, patient instructed to call the office if not contacted within 2 weeks.
Consent: Verbal consent was obtained and risks were reviewed including but not limited to scarring, infection, bleeding, incomplete removal, nerve damage and allergy to anesthesia.
Detail Level: Detailed
Biopsy Type: Epidermal Nerve Fiber Density
Wound Care: Aquaphor
Billing Type: Third-Party Bill
Suture Removal: 10 days
Punch Size In Mm: 12
Wound Care: Mastisol
Dressing: steri-strips and pressure dressing

## 2023-03-13 ENCOUNTER — APPOINTMENT (RX ONLY)
Age: 48
Setting detail: DERMATOLOGY
End: 2023-03-13

## 2023-03-13 DIAGNOSIS — Z48.00 ENCOUNTER FOR CHANGE OR REMOVAL OF NONSURGICAL WOUND DRESSING: ICD-10-CM

## 2023-03-13 PROCEDURE — ? SUTURE REMOVAL (NO GLOBAL PERIOD)

## 2023-03-13 PROCEDURE — 99211 OFF/OP EST MAY X REQ PHY/QHP: CPT

## 2023-03-13 ASSESSMENT — LOCATION ZONE DERM: LOCATION ZONE: TRUNK

## 2023-03-13 ASSESSMENT — LOCATION DETAILED DESCRIPTION DERM: LOCATION DETAILED: LEFT LATERAL ABDOMEN

## 2023-03-13 ASSESSMENT — LOCATION SIMPLE DESCRIPTION DERM: LOCATION SIMPLE: ABDOMEN

## 2023-04-07 ENCOUNTER — APPOINTMENT (RX ONLY)
Age: 48
Setting detail: DERMATOLOGY
End: 2023-04-07

## 2023-11-30 NOTE — PROCEDURE: PATIENT SPECIFIC COUNSELING
Detail Level: Zone
**Her patch testing result (my read):\\nnickel 1+, neomycin 2+.  thimerisol 2+, bon mix +/-, fragrance mix +/-. \\nShe was using a topical antibiotic, perhaps with Neosporin which culd explain her recent flare; no known expsoure to nickel or thimerisol so these are not clearly relevent. \\nWritten instructions provided: \\nWash face with Vanicream bar soap and moisturize with Vaseline 5x daily. Use Elidel TWICE A DAY cream. Two Claritin by mouth daily. We discussed a 2 week trial of rolling up the carpet given her dust mite allergies. Given her continued symptoms, minimally useful patch test reults, and failure to improve with treatment, cultures, fungal and bacterial were collected today.\\nI counseled her to stop using heat packs on her eyelids. \\n
Adult

## 2024-11-25 NOTE — PROCEDURE: PATIENT SPECIFIC COUNSELING
Detail Level: Zone
See eval for details. Pt displayed deficits with functional mobility/ transfers, deficits with adl's skills also decrease b ue strength/endurance. Recommendation: LOW INTENSITY (RETURN HOME WITH 24 HOUR CARE AND HOME HEALTH O.t.)    
**I recommend using the olive oil bar soap for cleansing and vaseline for moisturizing until we have the results of the patch testing.  We discussed the fact that she may be transferring an allergen from clothes or pen to her face with all the scratching that she is doing. She will be able to go back on antihistamines once the patch testing is over. I highly recommend using a pillow with dust mite covers and dust mite covers over the couch she is sleeping on given her known allergy to dustmites. She reports this might not be possible.